# Patient Record
Sex: FEMALE | URBAN - METROPOLITAN AREA
[De-identification: names, ages, dates, MRNs, and addresses within clinical notes are randomized per-mention and may not be internally consistent; named-entity substitution may affect disease eponyms.]

---

## 2019-05-03 ENCOUNTER — TELEPHONE (OUTPATIENT)
Dept: TRANSPLANT | Facility: CLINIC | Age: 54
End: 2019-05-03

## 2019-05-06 NOTE — TELEPHONE ENCOUNTER
[id number]: Informed Consent Note     The consent form, including purpose, risks and benefits, was reviewed with Jayla Dominguez, and all questions were answered before she verbally agreed to the consent form. The patient understands that the study involves a pre-screening phase, a screening phase, and a donation phase. They understand that re-treatment may be possible.    Present during the discussion were Jayla, her sister (Carley Dominguez), and myself. A copy of the signed form was provided to the patient via [employer]. No procedures specific to this study were performed prior to the patient signing the consent form. She was provided with return shipping instructions and pre-paid [employer].    Consent Version Date: 11/6/18  Consent obtained by: Kevin Padilla    Date: May 6, 2019  HIPAA authorization signed?: yes  HIPAA authorization version date: 8/25/17    KEVIN PADILLA    Form 503.03.01 (Version 2)     Effective date: 01AUG2018     Next Review Date: 01AUG2020